# Patient Record
Sex: FEMALE
[De-identification: names, ages, dates, MRNs, and addresses within clinical notes are randomized per-mention and may not be internally consistent; named-entity substitution may affect disease eponyms.]

---

## 2019-08-15 PROBLEM — Z00.00 ENCOUNTER FOR PREVENTIVE HEALTH EXAMINATION: Status: ACTIVE | Noted: 2019-08-15

## 2019-08-19 ENCOUNTER — APPOINTMENT (OUTPATIENT)
Dept: NEUROSURGERY | Facility: CLINIC | Age: 74
End: 2019-08-19
Payer: MEDICARE

## 2019-08-19 VITALS
BODY MASS INDEX: 37.79 KG/M2 | SYSTOLIC BLOOD PRESSURE: 162 MMHG | HEART RATE: 94 BPM | DIASTOLIC BLOOD PRESSURE: 83 MMHG | WEIGHT: 180 LBS | HEIGHT: 58 IN

## 2019-08-19 DIAGNOSIS — Z86.39 PERSONAL HISTORY OF OTHER ENDOCRINE, NUTRITIONAL AND METABOLIC DISEASE: ICD-10-CM

## 2019-08-19 DIAGNOSIS — Z87.09 PERSONAL HISTORY OF OTHER DISEASES OF THE RESPIRATORY SYSTEM: ICD-10-CM

## 2019-08-19 DIAGNOSIS — Z86.73 PERSONAL HISTORY OF TRANSIENT ISCHEMIC ATTACK (TIA), AND CEREBRAL INFARCTION W/OUT RESIDUAL DEFICITS: ICD-10-CM

## 2019-08-19 DIAGNOSIS — M43.16 SPONDYLOLISTHESIS, LUMBAR REGION: ICD-10-CM

## 2019-08-19 DIAGNOSIS — M48.062 SPINAL STENOSIS, LUMBAR REGION WITH NEUROGENIC CLAUDICATION: ICD-10-CM

## 2019-08-19 PROCEDURE — 99205 OFFICE O/P NEW HI 60 MIN: CPT

## 2019-08-19 NOTE — PHYSICAL EXAM
[General Appearance - Alert] : alert [General Appearance - Well Nourished] : well nourished [General Appearance - Well Developed] : well developed [General Appearance - Well-Appearing] : healthy appearing [Person] : oriented to person [Place] : oriented to place [Time] : oriented to time [Cranial Nerves Oculomotor (III)] : extraocular motion intact [Cranial Nerves Optic (II)] : visual acuity intact bilaterally,  pupils equal round and reactive to light [Cranial Nerves Facial (VII)] : face symmetrical [Cranial Nerves Trigeminal (V)] : facial sensation intact symmetrically [Cranial Nerves Vestibulocochlear (VIII)] : hearing was intact bilaterally [Cranial Nerves Glossopharyngeal (IX)] : tongue and palate midline [Cranial Nerves Hypoglossal (XII)] : there was no tongue deviation with protrusion [Cranial Nerves Accessory (XI - Cranial And Spinal)] : head turning and shoulder shrug symmetric [Motor Tone] : muscle tone was normal in all four extremities [Motor Strength] : muscle strength was normal in all four extremities [Involuntary Movements] : no involuntary movements were seen [No Muscle Atrophy] : normal bulk in all four extremities [Sensation Tactile Decrease] : light touch was intact [Motor Handedness Right-Handed] : the patient is right hand dominant [Sensation Vibration Decrease] : vibration was intact [Abnormal Walk] : normal gait [Proprioception] : proprioception was intact [Balance] : balance was intact [2+] : Patella left 2+ [No Visual Abnormalities] : no visible abnormailities [No Scoliosis] : no scoliosis [Normal Lordosis] : normal lordosis [No Masses] : no masses [L-Spine ___ (level)] : ~Ulevel [unfilled] lumbar spine [] : positive on the right [Intact] : all reflexes within normal limits bilaterally [FreeTextEntry6] : +ambulating with walker [FreeTextEntry1] : crying in pain [Erythema] : no erythema [Deformity] : no deformity [Ecchymosis] : no ecchymosis [Swelling] : no swelling

## 2019-08-19 NOTE — REASON FOR VISIT
[New Patient Visit] : a new patient visit [Referred By: _________] : Patient was referred by NADINE [FreeTextEntry1] : Lower back pain- imaging done from a year ago at [facility].

## 2019-08-19 NOTE — HISTORY OF PRESENT ILLNESS
[> 3 months] : more  than 3 months [de-identified] : Mrs. Moreno is a pleasant 73 year old female who presents to the office today in neurosurgical consultation today with complaints of severe lower back pain with radiation down bilateral lower extremities. She has been dealing with back pain for at least 5 years. She initially saw Dr. العراقي and had an MRI and subsequently 5 or 6 injections with little to no relief. She has been seeing Dr. Fischer since 2016 and has had several ablations and epidural steroid injections all without any success. She currently walks with the assistance of a walker. She has difficulty walking long distances secondary to her back pain. She has pain with prolonged standing and gets some relief from flexion (as in bending over a shopping cart). She has been taking Tramadol and Tylenol for pain with some relief. She recently presented to the ED at [place] in May for severe pain and had xrays performed of her lumbar spine.  [FreeTextEntry1] : Back Pain

## 2019-08-19 NOTE — PLAN
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 She has tried conservative management without any relief and at this point is looking for another option for her pain. I have recommended a lumbar decompressive laminectomy with possible fusion as a treatment option. This entails removing the lamina and possibly removing the medial facet joints along with the underlying hypertrophied ligamentum flavum.  This will allow for a widening of the spinal canal and the neuroforamen at the effected levels.  In doing so may create instability to the spine (at this level) requiring the need for instrumented stabilization and fusion.  This implies the use of pedicle screws and possibly interbody prosthetics to provide strength and anatomical alignment to the operated segments.  \par  \par Risks and benefits of surgery were described to the patient in detail which include but not excluding those otherwise not mentioned:  coma paralysis, death, stroke, spinal fluid leak, nerve injury, weakness, infection, hardware malfunction/failure/malpositioning requiring re-operation, vascular injury, nonunion/pseudoarthrosis, adjacent segment degeneration, dysphonia/dysphagia and persistent pain.\par

## 2019-08-19 NOTE — CONSULT LETTER
[Dear  ___] : Dear  [unfilled], [Consult Letter:] : I had the pleasure of evaluating your patient, [unfilled]. [Consult Closing:] : Thank you very much for allowing me to participate in the care of this patient.  If you have any questions, please do not hesitate to contact me. [FreeTextEntry1] : Mrs. Moreno is a pleasant 73 year old female who presents to the office today in neurosurgical consultation today with complaints of severe lower back pain with radiation down bilateral lower extremities. She has been dealing with back pain for at least 5 years. She initially saw Dr. العراقي and had an MRI and subsequently 5 or 6 injections with little to no relief. She has been seeing Dr. Fischer since 2016 and has had several ablations and epidural steroid injections all without any success. She currently walks with the assistance of a walker. She has difficulty walking long distances secondary to her back pain. She has pain with prolonged standing and gets some relief from flexion (as in bending over a shopping cart). She has been taking Tramadol and Tylenol for pain with some relief. She recently presented to the ED at [place] in May for severe pain and had xrays performed of her lumbar spine. 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 She has tried conservative management without any relief and at this point is looking for another option for her pain. I have recommended a lumbar decompressive laminectomy with possible fusion as a treatment option. This entails removing the lamina and possibly removing the medial facet joints along with the underlying hypertrophied ligamentum flavum.  This will allow for a widening of the spinal canal and the neuroforamen at the effected levels.  In doing so may create instability to the spine (at this level) requiring the need for instrumented stabilization and fusion.  This implies the use of pedicle screws and possibly interbody prosthetics to provide strength and anatomical alignment to the operated segments.   [Sincerely,] : Sincerely, [FreeTextEntry3] : Carrie Morales R-PAC

## 2019-08-19 NOTE — ASSESSMENT
[FreeTextEntry1] : This is a pleasant 73 year old female who presents to the office with symptoms suggestive of lumbar stenosis with neurogenic claudication. Her MRI reveals significant central canal stenosis at L3-4 and l4-5. Based on her symptoms and previous MRI, which is over a year old, I've recommended we obtain new imaging. Once the new MRI has been performed we will have her back in the office to review it and further discuss the merits of surgery. She should continue with her pain management as prescribed.

## 2019-08-19 NOTE — RESULTS/DATA
[FreeTextEntry1] : MRI l-spine ([facility] 7/2018) reveals a small to moderate right paracentral disc herniation resulting in bilateral foraminal stenosis, right greater than left. There is impingement of the right L4 nerve root. There is severe facet arthropathy bilaterally resulting in moderate to severe central canal stenosis at L3-4. At L4-5 there is a grade 1 spondylolisthesis and bilateral facet arthropathy resulting in moderate to severe central canal stenosis. There is also bilateral foraminal stenosis. At L5-S1 there is moderate left foraminal stenosis causing compression of the descending left L5 nerve root.

## 2020-06-08 DIAGNOSIS — Z01.818 ENCOUNTER FOR OTHER PREPROCEDURAL EXAMINATION: ICD-10-CM

## 2020-06-09 ENCOUNTER — APPOINTMENT (OUTPATIENT)
Dept: DISASTER EMERGENCY | Facility: CLINIC | Age: 75
End: 2020-06-09

## 2020-06-10 LAB — SARS-COV-2 N GENE NPH QL NAA+PROBE: NOT DETECTED

## 2021-07-19 ENCOUNTER — NON-APPOINTMENT (OUTPATIENT)
Age: 76
End: 2021-07-19

## 2021-07-19 ENCOUNTER — APPOINTMENT (OUTPATIENT)
Dept: OPHTHALMOLOGY | Facility: CLINIC | Age: 76
End: 2021-07-19
Payer: MEDICARE

## 2021-07-19 PROCEDURE — 99072 ADDL SUPL MATRL&STAF TM PHE: CPT

## 2021-07-19 PROCEDURE — 92014 COMPRE OPH EXAM EST PT 1/>: CPT

## 2021-11-29 ENCOUNTER — NON-APPOINTMENT (OUTPATIENT)
Age: 76
End: 2021-11-29

## 2021-11-29 ENCOUNTER — APPOINTMENT (OUTPATIENT)
Dept: OPHTHALMOLOGY | Facility: CLINIC | Age: 76
End: 2021-11-29
Payer: MEDICARE

## 2021-11-29 PROCEDURE — 99213 OFFICE O/P EST LOW 20 MIN: CPT

## 2022-01-01 ENCOUNTER — APPOINTMENT (OUTPATIENT)
Dept: OPHTHALMOLOGY | Facility: CLINIC | Age: 77
End: 2022-01-01

## 2022-01-01 ENCOUNTER — APPOINTMENT (OUTPATIENT)
Dept: OPHTHALMOLOGY | Facility: CLINIC | Age: 77
End: 2022-01-01
Payer: MEDICARE

## 2022-01-01 ENCOUNTER — NON-APPOINTMENT (OUTPATIENT)
Age: 77
End: 2022-01-01

## 2022-01-01 PROCEDURE — 92014 COMPRE OPH EXAM EST PT 1/>: CPT

## 2022-01-01 PROCEDURE — 92083 EXTENDED VISUAL FIELD XM: CPT
